# Patient Record
Sex: MALE | Race: WHITE | URBAN - METROPOLITAN AREA
[De-identification: names, ages, dates, MRNs, and addresses within clinical notes are randomized per-mention and may not be internally consistent; named-entity substitution may affect disease eponyms.]

---

## 2023-03-14 ENCOUNTER — TELEPHONE (OUTPATIENT)
Dept: UROLOGY | Facility: AMBULATORY SURGERY CENTER | Age: 58
End: 2023-03-14

## 2023-03-14 NOTE — TELEPHONE ENCOUNTER
Please Triage  New Patient    What is the reason for the patient’s appointment? Low testosterone     What office location does the patient prefer? [place]     Imaging/Lab Results:    Do we accept the patient's insurance or is the patient Self-Pay? Yes     Insurance Provider: [employer]  Plan Type/Number:   Member ID#: [id number]    Has the patient had any previous Urologist(s)? No     Have patient records been requested? If not are records showing in Epic: records will be faxed     Has the patient had any outside testing done? No     Does the patient have a personal history of cancer?  No

## 2023-04-17 ENCOUNTER — OFFICE VISIT (OUTPATIENT)
Dept: UROLOGY | Facility: AMBULATORY SURGERY CENTER | Age: 58
End: 2023-04-17

## 2023-04-17 VITALS
HEIGHT: 71 IN | RESPIRATION RATE: 18 BRPM | BODY MASS INDEX: 37.8 KG/M2 | WEIGHT: 270 LBS | DIASTOLIC BLOOD PRESSURE: 78 MMHG | OXYGEN SATURATION: 97 % | SYSTOLIC BLOOD PRESSURE: 132 MMHG | HEART RATE: 96 BPM

## 2023-04-17 DIAGNOSIS — R79.89 LOW TESTOSTERONE: ICD-10-CM

## 2023-04-17 DIAGNOSIS — E29.1 HYPOGONADISM IN MALE: Primary | ICD-10-CM

## 2023-04-17 LAB
SL AMB  POCT GLUCOSE, UA: NORMAL
SL AMB LEUKOCYTE ESTERASE,UA: NORMAL
SL AMB POCT BILIRUBIN,UA: NORMAL
SL AMB POCT BLOOD,UA: NORMAL
SL AMB POCT CLARITY,UA: CLEAR
SL AMB POCT COLOR,UA: YELLOW
SL AMB POCT KETONES,UA: NORMAL
SL AMB POCT NITRITE,UA: NORMAL
SL AMB POCT PH,UA: 0.5
SL AMB POCT SPECIFIC GRAVITY,UA: 1.01
SL AMB POCT URINE PROTEIN: NORMAL
SL AMB POCT UROBILINOGEN: 0.2

## 2023-04-17 RX ORDER — FENOFIBRATE 160 MG/1
TABLET ORAL
COMMUNITY
Start: 2023-03-30

## 2023-04-17 RX ORDER — INSULIN GLARGINE 300 U/ML
INJECTION, SOLUTION SUBCUTANEOUS
COMMUNITY
Start: 2023-03-17

## 2023-04-17 RX ORDER — CHOLECALCIFEROL (VITAMIN D3) 25 MCG
1000 CAPSULE ORAL DAILY
COMMUNITY
Start: 2023-01-26

## 2023-04-17 RX ORDER — SEMAGLUTIDE 1.34 MG/ML
INJECTION, SOLUTION SUBCUTANEOUS
COMMUNITY
Start: 2023-04-11

## 2023-04-17 RX ORDER — SILDENAFIL 100 MG/1
TABLET, FILM COATED ORAL
COMMUNITY
Start: 2023-03-15

## 2023-04-17 NOTE — PROGRESS NOTES
4/17/2023      Chief Complaint   Patient presents with   • New Patient Visit     Assessment and Plan    62 y o  male managed by NEW PATIENT    1  Hypogonadism   · Testosterone level prior to present to the office resulted 175 ng/dL  · We discussed potential testosterone placement therapy but will need to perform additional amount testing  · Discussed modalities of treatment-topical versus injectable versus Testopel  · Patient will decide on modality of treatment at follow-up office visit in approximately 3 to 4 weeks        History of Present Illness  Dann Hannon is a 62 y o  male here for follow up evaluation of  hypogonadism  Patient presents to the office today as a new patient with concerns regarding most recent testosterone level of 175 ng/dL  In addition to these lab values he also reports decreased libido, generalized fatigue, generalized not feeling well/rundown, decreased activity  He reports erectile dysfunction; attaining and maintaining an erection suitable for sexual activity  He was previously started on sildenafil by his PCP with no significant change in his ability to perform sexually  Denies a previous history of bone fractures        Patient does have a history of diabetes with his most recent hemoglobin A1c at 8 0  His PCP is managing his glucose levels  He also reports a past history of surgical removal of cyst in the scrotum in 2010 in [place]  Patient reports smoking/vaping) and nicotine daily, he denies the use/abuse of illicit substances with rare use of alcohol  He denies a family history of prostate cancer      Patient reports having 3 children and desires no more children at this point in his life      Previous imaging performed at [facility] reveals a small left-sided hydrocele otherwise unremarkable findings  Review of Systems   Constitutional: Positive for fatigue  Negative for chills and fever  Respiratory: Negative for cough and shortness of breath  Cardiovascular: Negative for chest pain  Gastrointestinal: Negative for abdominal distention, abdominal pain, blood in stool, nausea and vomiting  Genitourinary: Negative for difficulty urinating, dysuria, enuresis, flank pain, frequency, hematuria and urgency  Skin: Negative for rash  Past Medical History  Past Medical History:   Diagnosis Date   • Diabetes 1 5, managed as type 2 ([facility] )        Past Social History  Past Surgical History:   Procedure Laterality Date   • BARIATRIC SURGERY N/A    • CYSTECTOMY N/A      Social History     Tobacco Use   Smoking Status Former   • Packs/day: 1 00   • Years: 30 00   • Total pack years: 30 00   • Types: Cigarettes   Smokeless Tobacco Never       Past Family History  History reviewed  No pertinent family history  Past Social history  Social History     Socioeconomic History   • Marital status: /Civil Union     Spouse name: Not on file   • Number of children: Not on file   • Years of education: Not on file   • Highest education level: Not on file   Occupational History   • Not on file   Tobacco Use   • Smoking status: Former     Packs/day: 1 00     Years: 30 00     Total pack years: 30 00     Types: Cigarettes   • Smokeless tobacco: Never   Vaping Use   • Vaping Use: Some days   Substance and Sexual Activity   • Alcohol use:  Yes     Alcohol/week: 1 0 standard drink of alcohol     Types: 1 Cans of beer per week   • Drug use: Not Currently   • Sexual activity: Not Currently   Other Topics Concern   • Not on file   Social History Narrative   • Not on file     Social Determinants of Health     Financial Resource Strain: Not on file   Food Insecurity: Not on file   Transportation Needs: Not on file   Physical Activity: Not on file   Stress: Not on file   Social Connections: Not on file   Intimate Partner Violence: Not on file   Housing Stability: Not on file       Current Medications  Current Outpatient Medications   Medication Sig Dispense Refill "  • Continuous Blood Gluc Sensor (FreeStyle Chencho 2 Sensor) MISC AS DIRECTED USE SENSOR EVERY 14 DAYS TRANSDERMALLY     • CVS D3 25 MCG (1000 UT) capsule Take 1,000 Units by mouth daily     • fenofibrate 160 MG tablet TAKE 1 TABLET BY MOUTH EVERY DAY FOR 30 DAYS     • metFORMIN (GLUCOPHAGE) 850 mg tablet      • Ozempic, 1 MG/DOSE, 4 MG/3ML injection pen INJECT 1 MG SUBCUTANEOUSLY WEEKLY     • sildenafil (VIAGRA) 100 mg tablet TAKE 1 TABLET ORALLY DAILY AS NEEDED     • Toujeo SoloStar 300 units/mL CONCENTRATED U-300 injection pen (1-unit dial) INJECT 70 UNITS SUBCUTANEOUS ONCE A DAY 30 DAYS     • Syringe/Needle, Disp, (SYRINGE 3CC/22GX1\") 22G X 1\" 3 ML MISC Use every 7 days 12 each 0   • testosterone cypionate (DEPO-TESTOSTERONE) 200 mg/mL SOLN Inject 0 5 mL (100 mg total) into a muscle every 7 days 10 mL 0     No current facility-administered medications for this visit  Allergies  Allergies   Allergen Reactions   • Penicillins Rash         The following portions of the patient's history were reviewed and updated as appropriate: allergies, current medications, past medical history, past social history, past surgical history and problem list       Vitals  Vitals:    04/17/23 1335   BP: 132/78   BP Location: Right arm   Patient Position: Sitting   Cuff Size: Standard   Pulse: 96   Resp: 18   SpO2: 97%   Weight: 122 kg (270 lb)   Height: 5' 11\" (1 803 m)           Physical Exam  Physical Exam  Vitals reviewed  Constitutional:       General: He is not in acute distress  Appearance: Normal appearance  He is normal weight  HENT:      Head: Normocephalic  Pulmonary:      Effort: No respiratory distress  Breath sounds: Normal breath sounds  Genitourinary:     Penis: Normal        Testes: Normal       Comments: DANDRE-prostate approximate 35 to 40 g with no nodules  Skin:     General: Skin is warm and dry  Neurological:      General: No focal deficit present        Mental Status: He is alert and oriented " "to person, place, and time  Psychiatric:         Mood and Affect: Mood normal          Behavior: Behavior normal            Results  No results found for this or any previous visit (from the past 1 hour(s))  ]  No results found for: \"PSA\"  Lab Results   Component Value Date    BUN 15 04/19/2023    CALCIUM 9 6 04/19/2023     04/19/2023    CO2 24 04/19/2023    CREATININE 0 80 04/19/2023    K 4 5 04/19/2023     Lab Results   Component Value Date    HCT 43 6 04/19/2023    HGB 14 3 04/19/2023    MCV 83 04/19/2023     04/19/2023    WBC 8 29 04/19/2023           Orders  Orders Placed This Encounter   Procedures   • CBC     Standing Status:   Future     Number of Occurrences:   1     Standing Expiration Date:   5/77/1275   • Follicle stimulating hormone     Standing Status:   Future     Number of Occurrences:   1     Standing Expiration Date:   4/17/2024   • Luteinizing hormone     Standing Status:   Future     Number of Occurrences:   1     Standing Expiration Date:   4/17/2024   • Prolactin     Standing Status:   Future     Number of Occurrences:   1     Standing Expiration Date:   4/17/2024   • Testosterone     This is a patient instruction: Fasting preferred  Collections for men not undergoing treatment must be completed between 7am-9am ONLY  Collection time restrictions are not applicable to women or men already undergoing treatment  Standing Status:   Future     Number of Occurrences:   1     Standing Expiration Date:   4/17/2024   • TSH, 3rd generation     This is a patient instruction: This test is non-fasting  Please drink two glasses of water morning of bloodwork  Standing Status:   Future     Number of Occurrences:   1     Standing Expiration Date:   4/17/2024   • Comprehensive metabolic panel     This is a patient instruction: Patient fasting for 8 hours or longer recommended       Standing Status:   Future     Number of Occurrences:   1     Standing Expiration Date:   4/17/2024   • " POCT urine dip       JESUS Mon

## 2023-04-19 ENCOUNTER — APPOINTMENT (OUTPATIENT)
Dept: LAB | Facility: HOSPITAL | Age: 58
End: 2023-04-19

## 2023-04-19 DIAGNOSIS — E29.1 HYPOGONADISM IN MALE: ICD-10-CM

## 2023-04-19 LAB
ALBUMIN SERPL BCP-MCNC: 4.1 G/DL (ref 3.5–5)
ALP SERPL-CCNC: 80 U/L (ref 34–104)
ALT SERPL W P-5'-P-CCNC: 43 U/L (ref 7–52)
ANION GAP SERPL CALCULATED.3IONS-SCNC: 9 MMOL/L (ref 4–13)
AST SERPL W P-5'-P-CCNC: 16 U/L (ref 13–39)
BILIRUB SERPL-MCNC: 0.58 MG/DL (ref 0.2–1)
BUN SERPL-MCNC: 15 MG/DL (ref 5–25)
CALCIUM SERPL-MCNC: 9.6 MG/DL (ref 8.4–10.2)
CHLORIDE SERPL-SCNC: 103 MMOL/L (ref 96–108)
CO2 SERPL-SCNC: 24 MMOL/L (ref 21–32)
CREAT SERPL-MCNC: 0.8 MG/DL (ref 0.6–1.3)
ERYTHROCYTE [DISTWIDTH] IN BLOOD BY AUTOMATED COUNT: 15 % (ref 11.6–15.1)
FSH SERPL-ACNC: 4.9 MIU/ML (ref 0.7–10.8)
GFR SERPL CREATININE-BSD FRML MDRD: 98 ML/MIN/1.73SQ M
GLUCOSE SERPL-MCNC: 324 MG/DL (ref 65–140)
HCT VFR BLD AUTO: 43.6 % (ref 36.5–49.3)
HGB BLD-MCNC: 14.3 G/DL (ref 12–17)
LH SERPL-ACNC: 4 MIU/ML (ref 1.2–10.6)
MCH RBC QN AUTO: 27.2 PG (ref 26.8–34.3)
MCHC RBC AUTO-ENTMCNC: 32.8 G/DL (ref 31.4–37.4)
MCV RBC AUTO: 83 FL (ref 82–98)
PLATELET # BLD AUTO: 278 THOUSANDS/UL (ref 149–390)
PMV BLD AUTO: 9.8 FL (ref 8.9–12.7)
POTASSIUM SERPL-SCNC: 4.5 MMOL/L (ref 3.5–5.3)
PROLACTIN SERPL-MCNC: 8.4 NG/ML (ref 2.5–17.4)
PROT SERPL-MCNC: 7.4 G/DL (ref 6.4–8.4)
RBC # BLD AUTO: 5.26 MILLION/UL (ref 3.88–5.62)
SODIUM SERPL-SCNC: 136 MMOL/L (ref 135–147)
TESTOST SERPL-MCNC: 166 NG/DL (ref 95–948)
TSH SERPL DL<=0.05 MIU/L-ACNC: 2.68 UIU/ML (ref 0.45–4.5)
WBC # BLD AUTO: 8.29 THOUSAND/UL (ref 4.31–10.16)

## 2023-06-07 ENCOUNTER — OFFICE VISIT (OUTPATIENT)
Dept: UROLOGY | Facility: AMBULATORY SURGERY CENTER | Age: 58
End: 2023-06-07

## 2023-06-07 VITALS
OXYGEN SATURATION: 94 % | HEART RATE: 90 BPM | SYSTOLIC BLOOD PRESSURE: 106 MMHG | BODY MASS INDEX: 40.59 KG/M2 | WEIGHT: 291 LBS | DIASTOLIC BLOOD PRESSURE: 80 MMHG

## 2023-06-07 DIAGNOSIS — E29.1 HYPOGONADISM IN MALE: Primary | ICD-10-CM

## 2023-06-07 RX ORDER — TESTOSTERONE CYPIONATE 200 MG/ML
100 INJECTION, SOLUTION INTRAMUSCULAR
Qty: 10 ML | Refills: 0 | Status: SHIPPED | OUTPATIENT
Start: 2023-06-07

## 2023-06-07 RX ORDER — SYRINGE W-NEEDLE,DISPOSAB,3 ML 25GX5/8"
SYRINGE, EMPTY DISPOSABLE MISCELLANEOUS
Qty: 12 EACH | Refills: 0 | Status: SHIPPED | OUTPATIENT
Start: 2023-06-07

## 2023-06-07 NOTE — PROGRESS NOTES
6/7/2023    Assessment and Plan    62 y o  male managed by our office    1  Hypogonadism  · Repeat testosterone 166 ng/dL; previously noted testosterone performed 3/10/2023 resulted 175 ng/dL  · Discussed options for testosterone placement therapy-topical versus injectable versus pellets  Discussed pros and cons of each form of therapy  · Patient wishes to proceed with injectable testosterone  · Testosterone 100 mg every 7-day prescription provided  · Patient will have testosterone level, CBC performed in 6 weeks with virtual visit to discuss results and symptoms        History of Present Illness  Savana Montilla is a 62 y o  male here for follow up evaluation of hypogonadism  Patient presents to the office today as a new patient with concerns regarding most recent testosterone level of 175 ng/dL  In addition to these lab values he also reports decreased libido, generalized fatigue, generalized not feeling well/rundown, decreased activity  He reports erectile dysfunction; attaining and maintaining an erection suitable for sexual activity  He was previously started on sildenafil by his PCP with no significant change in his ability to perform sexually  Denies a previous history of bone fractures  Patient does have a history of diabetes with his most recent hemoglobin A1c at 8 0  His PCP is managing his glucose levels  He also reports a past history of surgical removal of cyst in the scrotum in 2010 in [place]  Patient reports smoking/vaping) and nicotine daily, he denies the use/abuse of illicit substances with rare use of alcohol  He denies a family history of prostate cancer  Patient reports having 3 children and desires no more children at this point in his life  Previous imaging performed at [facility] reveals a small left-sided hydrocele otherwise unremarkable findings      At his previous office evaluation we discussed         Review of Systems   Constitutional: Negative for chills and fever  Respiratory: Negative for cough and shortness of breath  Cardiovascular: Negative for chest pain  Gastrointestinal: Negative for abdominal distention, abdominal pain, blood in stool, nausea and vomiting  Genitourinary: Negative for difficulty urinating, dysuria, enuresis, flank pain, frequency, hematuria and urgency  Skin: Negative for rash  Past Medical History  Past Medical History:   Diagnosis Date   • Diabetes 1 5, managed as type 2 ([facility] 75 )        Past Social History  Past Surgical History:   Procedure Laterality Date   • BARIATRIC SURGERY N/A    • CYSTECTOMY N/A      Social History     Tobacco Use   Smoking Status Former   • Packs/day: 1 00   • Years: 30 00   • Total pack years: 30 00   • Types: Cigarettes   Smokeless Tobacco Never       Past Family History  History reviewed  No pertinent family history  Past Social history  Social History     Socioeconomic History   • Marital status: /Civil Union     Spouse name: Not on file   • Number of children: Not on file   • Years of education: Not on file   • Highest education level: Not on file   Occupational History   • Not on file   Tobacco Use   • Smoking status: Former     Packs/day: 1 00     Years: 30 00     Total pack years: 30 00     Types: Cigarettes   • Smokeless tobacco: Never   Vaping Use   • Vaping Use: Some days   Substance and Sexual Activity   • Alcohol use:  Yes     Alcohol/week: 1 0 standard drink of alcohol     Types: 1 Cans of beer per week   • Drug use: Not Currently   • Sexual activity: Not Currently   Other Topics Concern   • Not on file   Social History Narrative   • Not on file     Social Determinants of Health     Financial Resource Strain: Not on file   Food Insecurity: Not on file   Transportation Needs: Not on file   Physical Activity: Not on file   Stress: Not on file   Social Connections: Not on file   Intimate Partner Violence: Not on file   Housing Stability: Not on file       Current "Medications  Current Outpatient Medications   Medication Sig Dispense Refill   • Continuous Blood Gluc Sensor (FreeStyle Chencho 2 Sensor) MISC AS DIRECTED USE SENSOR EVERY 14 DAYS TRANSDERMALLY     • CVS D3 25 MCG (1000 UT) capsule Take 1,000 Units by mouth daily     • fenofibrate 160 MG tablet TAKE 1 TABLET BY MOUTH EVERY DAY FOR 30 DAYS     • metFORMIN (GLUCOPHAGE) 850 mg tablet      • Ozempic, 1 MG/DOSE, 4 MG/3ML injection pen INJECT 1 MG SUBCUTANEOUSLY WEEKLY     • sildenafil (VIAGRA) 100 mg tablet TAKE 1 TABLET ORALLY DAILY AS NEEDED     • Syringe/Needle, Disp, (SYRINGE 3CC/22GX1\") 22G X 1\" 3 ML MISC Use every 7 days 12 each 0   • testosterone cypionate (DEPO-TESTOSTERONE) 200 mg/mL SOLN Inject 0 5 mL (100 mg total) into a muscle every 7 days 10 mL 0   • Toujeo SoloStar 300 units/mL CONCENTRATED U-300 injection pen (1-unit dial) INJECT 70 UNITS SUBCUTANEOUS ONCE A DAY 30 DAYS       No current facility-administered medications for this visit  Allergies  Allergies   Allergen Reactions   • Penicillins Rash         The following portions of the patient's history were reviewed and updated as appropriate: allergies, current medications, past medical history, past social history, past surgical history and problem list       Vitals  Vitals:    06/07/23 0853   BP: 106/80   BP Location: Left arm   Patient Position: Sitting   Cuff Size: Large   Pulse: 90   SpO2: 94%   Weight: 132 kg (291 lb)           Physical Exam  Physical Exam  Vitals reviewed  Constitutional:       General: He is not in acute distress  Appearance: Normal appearance  He is normal weight  HENT:      Head: Normocephalic  Eyes:      Pupils: Pupils are equal, round, and reactive to light  Cardiovascular:      Rate and Rhythm: Normal rate  Pulmonary:      Effort: No respiratory distress  Breath sounds: Normal breath sounds  Skin:     General: Skin is warm and dry  Neurological:      General: No focal deficit present        Mental " "Status: He is alert and oriented to person, place, and time  Psychiatric:         Mood and Affect: Mood normal          Behavior: Behavior normal            Results  No results found for this or any previous visit (from the past 1 hour(s))  ]  No results found for: \"PSA\"  Lab Results   Component Value Date    BUN 15 04/19/2023    CALCIUM 9 6 04/19/2023     04/19/2023    CO2 24 04/19/2023    CREATININE 0 80 04/19/2023    K 4 5 04/19/2023     Lab Results   Component Value Date    HCT 43 6 04/19/2023    HGB 14 3 04/19/2023    MCV 83 04/19/2023     04/19/2023    WBC 8 29 04/19/2023           Orders  Orders Placed This Encounter   Procedures   • CBC     Standing Status:   Future     Standing Expiration Date:   6/7/2024   • Testosterone     This is a patient instruction: Fasting preferred  Collections for men not undergoing treatment must be completed between 7am-9am ONLY  Collection time restrictions are not applicable to women or men already undergoing treatment         Standing Status:   Future     Standing Expiration Date:   6/7/2024       JESUS Tipton  "

## 2023-06-26 ENCOUNTER — TELEPHONE (OUTPATIENT)
Dept: UROLOGY | Facility: AMBULATORY SURGERY CENTER | Age: 58
End: 2023-06-26

## 2023-06-26 NOTE — TELEPHONE ENCOUNTER
Pt called to see when he is suppose to complete labs due to pt doing his injections for Tuesday nights   Pt was not sure if labs are to be done before injections or after     Pt call [phone]

## 2023-07-14 ENCOUNTER — APPOINTMENT (OUTPATIENT)
Dept: LAB | Facility: HOSPITAL | Age: 58
End: 2023-07-14
Payer: COMMERCIAL

## 2023-07-14 DIAGNOSIS — E29.1 HYPOGONADISM IN MALE: ICD-10-CM

## 2023-07-14 LAB
ERYTHROCYTE [DISTWIDTH] IN BLOOD BY AUTOMATED COUNT: 15 % (ref 11.6–15.1)
HCT VFR BLD AUTO: 46.5 % (ref 36.5–49.3)
HGB BLD-MCNC: 14.9 G/DL (ref 12–17)
MCH RBC QN AUTO: 27.1 PG (ref 26.8–34.3)
MCHC RBC AUTO-ENTMCNC: 32 G/DL (ref 31.4–37.4)
MCV RBC AUTO: 85 FL (ref 82–98)
PLATELET # BLD AUTO: 279 THOUSANDS/UL (ref 149–390)
PMV BLD AUTO: 9.4 FL (ref 8.9–12.7)
RBC # BLD AUTO: 5.5 MILLION/UL (ref 3.88–5.62)
TESTOST SERPL-MSCNC: 614 NG/DL
WBC # BLD AUTO: 8.13 THOUSAND/UL (ref 4.31–10.16)

## 2023-07-14 PROCEDURE — 36415 COLL VENOUS BLD VENIPUNCTURE: CPT

## 2023-07-14 PROCEDURE — 85027 COMPLETE CBC AUTOMATED: CPT

## 2023-07-14 PROCEDURE — 84403 ASSAY OF TOTAL TESTOSTERONE: CPT

## 2023-07-19 ENCOUNTER — TELEMEDICINE (OUTPATIENT)
Dept: UROLOGY | Facility: AMBULATORY SURGERY CENTER | Age: 58
End: 2023-07-19

## 2023-07-19 DIAGNOSIS — E29.1 HYPOGONADISM IN MALE: ICD-10-CM

## 2023-07-19 RX ORDER — SYRINGE W-NEEDLE,DISPOSAB,3 ML 25GX5/8"
SYRINGE, EMPTY DISPOSABLE MISCELLANEOUS
Qty: 12 EACH | Refills: 3 | Status: SHIPPED | OUTPATIENT
Start: 2023-07-19

## 2023-07-19 NOTE — PROGRESS NOTES
7/19/2023    Virtual Visit  Assessment and Plan    62 y.o. male managed by our office. 1. Hypogonadism  ? Continue with injectable testosterone  ? Testosterone 100 mg every 7-day prescription provided  ? Testosterone level performed 7/14/2023 614 ng/dL  ? CBC performed 7/14/2023 unremarkable  ? Repeat testosterone, CBC, CMP in 6 months  ? Follow up in the office in 6 months      History of Present Illness  Vertie Ahumada is a 62 y.o. male here for follow up evaluation of hypogonadism. Patient presents to the office today as a new patient with concerns regarding most recent testosterone level of 175 ng/dL. In addition to these lab values he also reports decreased libido, generalized fatigue, generalized not feeling well/rundown, decreased activity. He reports erectile dysfunction; attaining and maintaining an erection suitable for sexual activity. He was previously started on sildenafil by his PCP with no significant change in his ability to perform sexually. Denies a previous history of bone fractures.       Patient does have a history of diabetes with his most recent hemoglobin A1c at 8.0. His PCP is managing his glucose levels. He also reports a past history of surgical removal of cyst in the scrotum in 2010 in [place]. Patient reports smoking/vaping) and nicotine daily, he denies the use/abuse of illicit substances with rare use of alcohol. He denies a family history of prostate cancer.     Patient reports having 3 children and desires no more children at this point in his life.     Previous imaging performed at [facility] reveals a small left-sided hydrocele otherwise unremarkable findings.     At his previous office evaluation we discussed       Review of Systems   Constitutional: Negative for chills and fever. Respiratory: Negative for cough and shortness of breath. Cardiovascular: Negative for chest pain.    Gastrointestinal: Negative for abdominal distention, abdominal pain, blood in stool, nausea and vomiting. Genitourinary: Negative for difficulty urinating, dysuria, enuresis, flank pain, frequency, hematuria and urgency. Skin: Negative for rash. Past Medical History  Past Medical History:   Diagnosis Date   • Diabetes 1.5, managed as type 2 ([address])        Past Social History  Past Surgical History:   Procedure Laterality Date   • BARIATRIC SURGERY N/A    • CYSTECTOMY N/A      Social History     Tobacco Use   Smoking Status Former   • Packs/day: 1.00   • Years: 30.00   • Total pack years: 30.00   • Types: Cigarettes   Smokeless Tobacco Never       Past Family History  No family history on file. Past Social history  Social History     Socioeconomic History   • Marital status: /Civil Union     Spouse name: Not on file   • Number of children: Not on file   • Years of education: Not on file   • Highest education level: Not on file   Occupational History   • Not on file   Tobacco Use   • Smoking status: Former     Packs/day: 1.00     Years: 30.00     Total pack years: 30.00     Types: Cigarettes   • Smokeless tobacco: Never   Vaping Use   • Vaping Use: Some days   Substance and Sexual Activity   • Alcohol use:  Yes     Alcohol/week: 1.0 standard drink of alcohol     Types: 1 Cans of beer per week   • Drug use: Not Currently   • Sexual activity: Not Currently   Other Topics Concern   • Not on file   Social History Narrative   • Not on file     Social Determinants of Health     Financial Resource Strain: Not on file   Food Insecurity: Not on file   Transportation Needs: Not on file   Physical Activity: Not on file   Stress: Not on file   Social Connections: Not on file   Intimate Partner Violence: Not on file   Housing Stability: Not on file       Current Medications  Current Outpatient Medications   Medication Sig Dispense Refill   • Syringe/Needle, Disp, (SYRINGE 3CC/22GX1") 22G X 1" 3 ML MISC Use every 7 days 12 each 3   • Continuous Blood Gluc Sensor (FreeStyle Chencho 2 Sensor) MISC AS DIRECTED USE SENSOR EVERY 14 DAYS TRANSDERMALLY     • CVS D3 25 MCG (1000 UT) capsule Take 1,000 Units by mouth daily     • fenofibrate 160 MG tablet TAKE 1 TABLET BY MOUTH EVERY DAY FOR 30 DAYS     • metFORMIN (GLUCOPHAGE) 850 mg tablet      • Ozempic, 1 MG/DOSE, 4 MG/3ML injection pen INJECT 1 MG SUBCUTANEOUSLY WEEKLY     • sildenafil (VIAGRA) 100 mg tablet TAKE 1 TABLET ORALLY DAILY AS NEEDED     • testosterone cypionate (DEPO-TESTOSTERONE) 200 mg/mL SOLN Inject 0.5 mL (100 mg total) into a muscle every 7 days 10 mL 0   • Toujeo SoloStar 300 units/mL CONCENTRATED U-300 injection pen (1-unit dial) INJECT 70 UNITS SUBCUTANEOUS ONCE A DAY 30 DAYS       No current facility-administered medications for this visit. Allergies  Allergies   Allergen Reactions   • Penicillins Rash     The following portions of the patient's history were reviewed and updated as appropriate: allergies, current medications, past medical history, past social history, past surgical history and problem list.    Results  No results found for this or any previous visit (from the past 1 hour(s)). ]  No results found for: "PSA"  Lab Results   Component Value Date    CALCIUM 9.6 04/19/2023    K 4.5 04/19/2023    CO2 24 04/19/2023     04/19/2023    BUN 15 04/19/2023    CREATININE 0.80 04/19/2023     Lab Results   Component Value Date    WBC 8.13 07/14/2023    HGB 14.9 07/14/2023    HCT 46.5 07/14/2023    MCV 85 07/14/2023     07/14/2023       Orders  Orders Placed This Encounter   Procedures   • CBC     Standing Status:   Future     Standing Expiration Date:   7/19/2024   • Testosterone     This is a patient instruction: Fasting preferred. Collections for men not undergoing treatment must be completed between 7am-9am ONLY. Collection time restrictions are not applicable to women or men already undergoing treatment.        Standing Status:   Future     Standing Expiration Date:   7/19/2024   • Comprehensive metabolic panel This is a patient instruction: Patient fasting for 8 hours or longer recommended.      Standing Status:   Future     Standing Expiration Date:   7/19/2024       JESUS Brito

## 2023-07-20 LAB — TESTOST SERPL-MSCNC: 614 NG/DL

## 2023-07-26 DIAGNOSIS — E29.1 HYPOGONADISM IN MALE: Primary | ICD-10-CM

## 2023-08-18 DIAGNOSIS — E29.1 HYPOGONADISM IN MALE: ICD-10-CM

## 2023-08-18 RX ORDER — TESTOSTERONE CYPIONATE 200 MG/ML
100 INJECTION, SOLUTION INTRAMUSCULAR
Qty: 10 ML | Refills: 0 | Status: SHIPPED | OUTPATIENT
Start: 2023-08-18

## 2023-10-25 DIAGNOSIS — E29.1 HYPOGONADISM IN MALE: ICD-10-CM

## 2023-10-25 RX ORDER — SYRINGE W-NEEDLE,DISPOSAB,3 ML 25GX5/8"
SYRINGE, EMPTY DISPOSABLE MISCELLANEOUS
Qty: 15 EACH | Refills: 0 | Status: SHIPPED | OUTPATIENT
Start: 2023-10-25

## 2023-10-25 RX ORDER — TESTOSTERONE CYPIONATE 200 MG/ML
100 INJECTION, SOLUTION INTRAMUSCULAR
Qty: 10 ML | Refills: 0 | Status: SHIPPED | OUTPATIENT
Start: 2023-10-25

## 2023-11-01 DIAGNOSIS — E29.1 HYPOGONADISM IN MALE: ICD-10-CM
